# Patient Record
Sex: FEMALE | Race: WHITE | URBAN - METROPOLITAN AREA
[De-identification: names, ages, dates, MRNs, and addresses within clinical notes are randomized per-mention and may not be internally consistent; named-entity substitution may affect disease eponyms.]

---

## 2022-06-22 ENCOUNTER — OFFICE VISIT (OUTPATIENT)
Dept: URBAN - METROPOLITAN AREA CLINIC 72 | Facility: CLINIC | Age: 87
End: 2022-06-22
Payer: MEDICARE

## 2022-06-22 DIAGNOSIS — H43.813 VITREOUS DEGENERATION, BILATERAL: ICD-10-CM

## 2022-06-22 DIAGNOSIS — H02.839 DERMATOCHALASIS OF EYELID: ICD-10-CM

## 2022-06-22 DIAGNOSIS — H04.123 DRY EYE SYNDROME OF BILATERAL LACRIMAL GLANDS: Primary | ICD-10-CM

## 2022-06-22 PROCEDURE — 99203 OFFICE O/P NEW LOW 30 MIN: CPT

## 2022-06-22 ASSESSMENT — INTRAOCULAR PRESSURE
OS: 15
OD: 15

## 2022-06-22 NOTE — IMPRESSION/PLAN
Impression: Dry eye syndrome of bilateral lacrimal glands: H04.123. Plan: Discussed DX and today's findings in detail with pt. Recommend pt to continue with OTC artifical tear use 2-4x daily w/ emphasis on QAM and QHS doses. Recommend warm compress w/ lid massage. If symptoms continue/worsen will consider prescription drug therapy.

## 2022-06-22 NOTE — IMPRESSION/PLAN
Impression: Dermatochalasis of eyelid: H02.839. Plan: Discussed diagnosis in detail with patient. Discussed treatment options with patient, though none is required at this time. Will continue to observe condition and or symptoms. If pt would like to proceed with treatment recommend oculoplastic consult, with Dr Shilpa Capellan.

## 2022-08-30 ENCOUNTER — OFFICE VISIT (OUTPATIENT)
Dept: URBAN - METROPOLITAN AREA CLINIC 72 | Facility: CLINIC | Age: 87
End: 2022-08-30
Payer: MEDICARE

## 2022-08-30 DIAGNOSIS — H10.11 ACUTE ATOPIC CONJUNCTIVITIS, RIGHT EYE: Primary | ICD-10-CM

## 2022-08-30 DIAGNOSIS — H02.839 DERMATOCHALASIS OF EYELID: ICD-10-CM

## 2022-08-30 DIAGNOSIS — H04.123 DRY EYE SYNDROME OF BILATERAL LACRIMAL GLANDS: ICD-10-CM

## 2022-08-30 PROCEDURE — 99212 OFFICE O/P EST SF 10 MIN: CPT | Performed by: OPTOMETRIST

## 2022-08-30 ASSESSMENT — INTRAOCULAR PRESSURE
OD: 13
OS: 14

## 2022-08-30 NOTE — IMPRESSION/PLAN
Impression: Dry eye syndrome of bilateral lacrimal glands: H04.123. Plan: Educated pt on dry eye Recommend increasing OTC artifical tear use 2-4x daily w/ emphasis on QAM and QHS doses. Discussed possible improvement with thicker gel or ointment QHS. Recommend warm compress w/ lid massage. If symptoms continue/worsen will consider prescription drug therapy.

## 2022-08-30 NOTE — IMPRESSION/PLAN
Impression: Acute atopic conjunctivitis, right eye: H10.11. Plan: Discussed allergies with pt Recommend pt to start Pataday 1 gtt QAM OU during allergy season, or year round if needed. pt to use AFT as well to help with the dryness.